# Patient Record
Sex: MALE | Race: WHITE | NOT HISPANIC OR LATINO | Employment: OTHER | URBAN - METROPOLITAN AREA
[De-identification: names, ages, dates, MRNs, and addresses within clinical notes are randomized per-mention and may not be internally consistent; named-entity substitution may affect disease eponyms.]

---

## 2019-09-25 ENCOUNTER — OFFICE VISIT (OUTPATIENT)
Dept: UROLOGY | Facility: CLINIC | Age: 34
End: 2019-09-25

## 2019-09-25 VITALS
DIASTOLIC BLOOD PRESSURE: 82 MMHG | BODY MASS INDEX: 23.49 KG/M2 | HEART RATE: 95 BPM | HEIGHT: 74 IN | WEIGHT: 183 LBS | SYSTOLIC BLOOD PRESSURE: 149 MMHG

## 2019-09-25 DIAGNOSIS — E29.1 TESTICULAR FAILURE: Primary | ICD-10-CM

## 2019-09-25 PROCEDURE — 99999 PR PBB SHADOW E&M-EST. PATIENT-LVL III: ICD-10-PCS | Mod: PBBFAC,,, | Performed by: UROLOGY

## 2019-09-25 PROCEDURE — 99999 PR PBB SHADOW E&M-EST. PATIENT-LVL III: CPT | Mod: PBBFAC,,, | Performed by: UROLOGY

## 2019-09-25 PROCEDURE — 99204 OFFICE O/P NEW MOD 45 MIN: CPT | Mod: S$GLB,,, | Performed by: UROLOGY

## 2019-09-25 PROCEDURE — 99204 PR OFFICE/OUTPT VISIT, NEW, LEVL IV, 45-59 MIN: ICD-10-PCS | Mod: S$GLB,,, | Performed by: UROLOGY

## 2019-09-25 RX ORDER — MULTIVITAMIN
1 TABLET ORAL
COMMUNITY

## 2019-09-25 RX ORDER — DEXTROAMPHETAMINE SACCHARATE, AMPHETAMINE ASPARTATE, DEXTROAMPHETAMINE SULFATE AND AMPHETAMINE SULFATE 5; 5; 5; 5 MG/1; MG/1; MG/1; MG/1
20 TABLET ORAL
COMMUNITY
Start: 2019-05-23

## 2019-09-25 NOTE — PROGRESS NOTES
"Chief Complaint:  Infertility    HPI:    Mr. Isidro is a 34 y.o.  male who has been  to his wife for the past 1 years. They have been trying to achieve a pregnancy for the past 1 years but without success. 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 He denies a history of erectile dysfunction and ejaculatory problems.    He is s/p R orchiectomy as a child for either UDT or torsion.    He has achieved 0 pregnancies in the past.    SA 19--4.5 cc/2.9 million per cc/32%/1.5%    HORMONE PANEL 19--  T--495  LH--4.9  FSH--7.5  Estradiol--14.8  Prolactin--9.2    Aracely Isidro is 35 years old. ( [birth year]) Her menses are regular. She has not undergone prior hysterosalpingogram. She has achieved 0 prior pregnancies.  She sees Dr. Montes.    The couple has not undergone prior intrauterine insemination procedures.    The couple has not undergone prior in-vitro fertilization procedures.    Jeovany Isidro denies a history of exposure to harmful chemicals, toxins, and radiation.    No history of recent fevers greater than 101.5 degrees Farenheit.    No history of recent exposure to "wet heat."    + history of urological trauma or testicular torsion.    No history of prostatitis, epididymitis, and orchitis.    No history of post-pubertal mumps.    There is no known family history of fertility problems.    REVIEW OF SYSTEMS:     He denies headache, blurred vision, fever, nausea, vomiting, chills, flank discomfort, abdominal pain, bleeding per rectum, cough, SOB, recent loss of consciousness, recent mental status changes, seizures, dizziness, or upper/lower extremity weakness.    PHYSICAL EXAM:     The patient generally appears in good health, is appropriately interactive, and is in no apparent distress.     Eyes: anicteric sclerae, moist conjunctivae; no lid-lag; PERRLA     HENT: Atraumatic; oropharynx clear with moist mucous membranes and no mucosal " "ulcerations;normal hard and soft palate.  No evidence of lymphadenopathy.    Neck: Trachea midline.  No thyromegaly.    Musculoskeletal: No abnormal gait.    Skin: No lesions.    Mental: Cooperative with normal affect.  Is oriented to time, place, and person.    Neuro: Grossly intact.    Chest: Normal inspiratory effort.   No accessory muscles.  No audible wheezes.  Respirations symmetric on inspiration and expiration.    Heart: Regular rhythm.      Abdomen:  Soft, non-tender. No masses or organomegaly. Bladder is not palpable. No evidence of flank discomfort. No evidence of inguinal hernia.    Genitourinary: Penis is normal with no evidence of plaques or induration. Urethral meatus is normal. Scrotum is normal. Testes are descended on the L with no evidence of abnormal masses or tenderness. Epididymis, vas deferens, and cord structures are normal bilaterally.  Testicular volume is approximately 22 cc on the L.    Extremities: No cyanosis, clubbing, or edema.    IMPRESSION & PLAN:    Mr. Isidro is a 34 y.o.  male who has been  to his wife for the past 1 years. They have been trying to achieve a pregnancy for the past 1 years but without success. 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 He denies a history of erectile dysfunction and ejaculatory problems.    He is s/p R orchiectomy as a child for either UDT or torsion.    He has achieved 0 pregnancies in the past.    SA 8/28/19--4.5 cc/2.9 million per cc/32%/1.5%    HORMONE PANEL 9/11/19--  T--495  LH--4.9  FSH--7.5  Estradiol--14.8  Prolactin--9.2    1.  Will draw karyotype and y chromosome microdeletion testing  2.  Semen analysis x 1 more.  3.  Return to the clinic in 3 weeks to discuss test results and treatment plan.  4.  Recommend avoiding "wet heat."  5.  Recommend taking a multivitamin and 500 mg of vitamin c daily in addition to the multivitamin.  6.  Please send a copy of the note to  " Simon.  Thank you for the consultation.      CC: Simon

## 2019-09-25 NOTE — LETTER
September 25, 2019        Aracely Montes MD  [address]  [facility] [address]             [facility] - Urology 4th Floor  [address]  [facility] [address]  Phone: [phone]   Patient: Jeovany Isidro   MR Number: [id number]   YOB: 1985   Date of Visit: 9/25/2019       Dear Dr. Montes:    Thank you for referring Jeovany Isidro to me for evaluation. Attached you will find relevant portions of my assessment and plan of care.    If you have questions, please do not hesitate to call me. I look forward to following Jeovany Isidro along with you.    Sincerely,      Hamzah Sarkar MD            CC  No Recipients    Enclosure

## 2019-10-16 ENCOUNTER — OFFICE VISIT (OUTPATIENT)
Dept: UROLOGY | Facility: CLINIC | Age: 34
End: 2019-10-16
Payer: COMMERCIAL

## 2019-10-16 VITALS
DIASTOLIC BLOOD PRESSURE: 78 MMHG | BODY MASS INDEX: 22.91 KG/M2 | WEIGHT: 178.56 LBS | HEART RATE: 59 BPM | SYSTOLIC BLOOD PRESSURE: 121 MMHG | HEIGHT: 74 IN

## 2019-10-16 DIAGNOSIS — E29.1 TESTICULAR FAILURE: Primary | ICD-10-CM

## 2019-10-16 PROCEDURE — 99999 PR PBB SHADOW E&M-EST. PATIENT-LVL III: ICD-10-PCS | Mod: PBBFAC,,, | Performed by: UROLOGY

## 2019-10-16 PROCEDURE — 99214 PR OFFICE/OUTPT VISIT, EST, LEVL IV, 30-39 MIN: ICD-10-PCS | Mod: S$GLB,,, | Performed by: UROLOGY

## 2019-10-16 PROCEDURE — 99214 OFFICE O/P EST MOD 30 MIN: CPT | Mod: S$GLB,,, | Performed by: UROLOGY

## 2019-10-16 PROCEDURE — 99999 PR PBB SHADOW E&M-EST. PATIENT-LVL III: CPT | Mod: PBBFAC,,, | Performed by: UROLOGY

## 2019-10-16 NOTE — PROGRESS NOTES
"Chief Complaint:  Infertility    HPI:    Mr. Isidro is a 34 y.o.  male who has been  to his wife for the past 1 years. They have been trying to achieve a pregnancy for the past 1 years but without success. Jeovany Isidro has  undergone a semen analysis x 2 showing severe oligoasthenoteratospermia on one and asthenoteratospermia on the other. He denies a history of erectile dysfunction and ejaculatory problems.    He is s/p R orchiectomy as a child for either UDT or torsion.    Karyotype and Y chromosome microdeletion testing are normal.    HORMONE PANEL 19--  T--495  LH--4.9  FSH--7.5  Estradiol--14.8  Prolactin--9.2    SA 19--4.5 cc/2.9 million per cc/32%/1.5%  SA 10/2/19--6.0 cc/23 million per cc/48%/2.5%    FOR REVIEW FROM PREVIOUS:    Mr. Isidro is a 34 y.o.  male who has been  to his wife for the past 1 years. They have been trying to achieve a pregnancy for the past 1 years but without success. 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 He denies a history of erectile dysfunction and ejaculatory problems.    He is s/p R orchiectomy as a child for either UDT or torsion.    He has achieved 0 pregnancies in the past.    SA 19--4.5 cc/2.9 million per cc/32%/1.5%    HORMONE PANEL 19--  T--495  LH--4.9  FSH--7.5  Estradiol--14.8  Prolactin--9.2    Aracely Isidro is 35 years old. ( [birth year]) Her menses are regular. She has not undergone prior hysterosalpingogram. She has achieved 0 prior pregnancies.  She sees Dr. Montes.    The couple has not undergone prior intrauterine insemination procedures.    The couple has not undergone prior in-vitro fertilization procedures.    Jeovany Isidro denies a history of exposure to harmful chemicals, toxins, and radiation.    No history of recent fevers greater than 101.5 degrees Farenheit.    No history of recent exposure to "wet heat."    + history of urological trauma or " testicular torsion.    No history of prostatitis, epididymitis, and orchitis.    No history of post-pubertal mumps.    There is no known family history of fertility problems.    REVIEW OF SYSTEMS:     He denies headache, blurred vision, fever, nausea, vomiting, chills, flank discomfort, abdominal pain, bleeding per rectum, cough, SOB, recent loss of consciousness, recent mental status changes, seizures, dizziness, or upper/lower extremity weakness.    PHYSICAL EXAM:     The patient generally appears in good health, is appropriately interactive, and is in no apparent distress.     Eyes: anicteric sclerae, moist conjunctivae; no lid-lag; PERRLA     HENT: Atraumatic; oropharynx clear with moist mucous membranes and no mucosal ulcerations;normal hard and soft palate.  No evidence of lymphadenopathy.    Neck: Trachea midline.  No thyromegaly.    Musculoskeletal: No abnormal gait.    Skin: No lesions.    Mental: Cooperative with normal affect.  Is oriented to time, place, and person.    Neuro: Grossly intact.    Chest: Normal inspiratory effort.   No accessory muscles.  No audible wheezes.  Respirations symmetric on inspiration and expiration.    Heart: Regular rhythm.      Abdomen:  Soft, non-tender. No masses or organomegaly. Bladder is not palpable. No evidence of flank discomfort. No evidence of inguinal hernia.    Genitourinary: Penis is normal with no evidence of plaques or induration. Urethral meatus is normal. Scrotum is normal. Testes are descended on the L with no evidence of abnormal masses or tenderness. Epididymis, vas deferens, and cord structures are normal bilaterally.  Testicular volume is approximately 22 cc on the L.    Extremities: No cyanosis, clubbing, or edema.    IMPRESSION & PLAN:    Mr. Isidro is a 34 y.o.  male who has been  to his wife for the past 1 years. They have been trying to achieve a pregnancy for the past 1 years but without success. Jeovany Isidro has  undergone a semen  "analysis x 2 showing severe oligoasthenoteratospermia on one and asthenoteratospermia on the other. He denies a history of erectile dysfunction and ejaculatory problems.    He is s/p R orchiectomy as a child for either UDT or torsion.    Karyotype and Y chromosome microdeletion testing are normal.    HORMONE PANEL 9/11/19--  T--495  LH--4.9  FSH--7.5  Estradiol--14.8  Prolactin--9.2    SA 8/28/19--4.5 cc/2.9 million per cc/32%/1.5%  SA 10/2/19--6.0 cc/23 million per cc/48%/2.5%    1.   Went over the results of his SA and hormonal panel today.    2.  Discussed that his two SA's are quite discordant.  Recommend a 3rd SA.  3.  Return to the clinic in 2 weeks to discuss test results and treatment plan.  4.  Recommend avoiding "wet heat."  5.  Recommend taking a multivitamin and 500 mg of vitamin c daily in addition to the multivitamin.  6.  Please send a copy of the note to Dr. Montes.    7.  I spent 25 minutes with the patient of which more than half was spent in direct consultation with the patient in regards to our treatment and plan.         CC: Simon    "

## 2019-10-20 ENCOUNTER — OFFICE VISIT (OUTPATIENT)
Dept: URGENT CARE | Facility: CLINIC | Age: 34
End: 2019-10-20

## 2019-10-20 VITALS
BODY MASS INDEX: 22.84 KG/M2 | HEIGHT: 74 IN | SYSTOLIC BLOOD PRESSURE: 124 MMHG | DIASTOLIC BLOOD PRESSURE: 75 MMHG | WEIGHT: 178 LBS | RESPIRATION RATE: 16 BRPM | TEMPERATURE: 98 F | HEART RATE: 75 BPM | OXYGEN SATURATION: 98 %

## 2019-10-20 DIAGNOSIS — S61.213A LACERATION OF LEFT MIDDLE FINGER, FOREIGN BODY PRESENCE UNSPECIFIED, NAIL DAMAGE STATUS UNSPECIFIED, INITIAL ENCOUNTER: Primary | ICD-10-CM

## 2019-10-20 PROCEDURE — 99214 PR OFFICE/OUTPT VISIT, EST, LEVL IV, 30-39 MIN: ICD-10-PCS | Mod: S$GLB,,, | Performed by: FAMILY MEDICINE

## 2019-10-20 PROCEDURE — 99214 OFFICE O/P EST MOD 30 MIN: CPT | Mod: S$GLB,,, | Performed by: FAMILY MEDICINE

## 2019-10-20 RX ORDER — SULFAMETHOXAZOLE AND TRIMETHOPRIM 800; 160 MG/1; MG/1
1 TABLET ORAL 2 TIMES DAILY
Qty: 20 TABLET | Refills: 0 | Status: SHIPPED | OUTPATIENT
Start: 2019-10-20 | End: 2019-10-30

## 2019-10-20 NOTE — PROGRESS NOTES
"Subjective:       Patient ID: Jeovany Isidro is a 34 y.o. male.    Vitals:  height is 6' 2" (1.88 m) and weight is 80.7 kg (178 lb). His temperature is 98.2 °F (36.8 °C). His blood pressure is 124/75 and his pulse is 75. His respiration is 16 and oxygen saturation is 98%.     Chief Complaint: Laceration (Left)    Pt states he cut his hand on a wine glass. He was washing the glass then it broke when he was druing it. Bleeding+ tetanus vaccine UTD.    Laceration    The incident occurred less than 1 hour ago. The laceration is located on the left hand. The laceration mechanism was a broken glass. The patient is experiencing no pain. He reports no foreign bodies present. His tetanus status is UTD.       Constitution: Negative for chills, fatigue and fever.   HENT: Negative for congestion and sore throat.    Neck: Negative for painful lymph nodes.   Cardiovascular: Negative for chest pain and leg swelling.   Eyes: Negative for double vision and blurred vision.   Respiratory: Negative for cough and shortness of breath.    Gastrointestinal: Negative for nausea, vomiting and diarrhea.   Genitourinary: Negative for dysuria, frequency and urgency.   Musculoskeletal: Negative for joint pain, joint swelling, muscle cramps and muscle ache.   Skin: Positive for laceration. Negative for color change, pale and rash.   Allergic/Immunologic: Negative for seasonal allergies.   Neurological: Negative for dizziness, history of vertigo, light-headedness, passing out and headaches.   Hematologic/Lymphatic: Negative for swollen lymph nodes, easy bruising/bleeding and history of blood clots. Does not bruise/bleed easily.   Psychiatric/Behavioral: Negative for nervous/anxious, sleep disturbance and depression. The patient is not nervous/anxious.        PMH/PSH/FH/SH/MED/ALLERGY reviewed    Objective:       Vitals:    10/20/19 1747   BP: 124/75   Pulse: 75   Resp: 16   Temp: 98.2 °F (36.8 °C)       Physical Exam   Constitutional: He is " oriented to person, place, and time. He appears well-developed and well-nourished. He is cooperative.  Non-toxic appearance. He does not appear ill. No distress.   HENT:   Head: Normocephalic and atraumatic.   Right Ear: Hearing, tympanic membrane, external ear and ear canal normal.   Left Ear: Hearing, tympanic membrane, external ear and ear canal normal.   Nose: Nose normal. No mucosal edema, rhinorrhea or nasal deformity. No epistaxis. Right sinus exhibits no maxillary sinus tenderness and no frontal sinus tenderness. Left sinus exhibits no maxillary sinus tenderness and no frontal sinus tenderness.   Mouth/Throat: Uvula is midline, oropharynx is clear and moist and mucous membranes are normal. No trismus in the jaw. Normal dentition. No uvula swelling. No posterior oropharyngeal erythema.   Eyes: Conjunctivae and lids are normal. Right eye exhibits no discharge. Left eye exhibits no discharge. No scleral icterus.   Neck: Trachea normal, normal range of motion, full passive range of motion without pain and phonation normal. Neck supple.   Cardiovascular: Normal rate, regular rhythm, normal heart sounds, intact distal pulses and normal pulses.   Pulmonary/Chest: Effort normal and breath sounds normal. No respiratory distress.   Abdominal: Soft. Normal appearance and bowel sounds are normal. He exhibits no distension, no pulsatile midline mass and no mass. There is no tenderness.   Musculoskeletal: Normal range of motion. He exhibits no edema or deformity.   Neurological: He is alert and oriented to person, place, and time. He exhibits normal muscle tone. Coordination normal.   Skin: Skin is warm, dry, intact, not diaphoretic and not pale.   Laceration 2 cm left middle finger. Bleeding+   Psychiatric: He has a normal mood and affect. His speech is normal and behavior is normal. Judgment and thought content normal. Cognition and memory are normal.   Nursing note and vitals reviewed.        Assessment:       1.  Laceration of left middle finger, foreign body presence unspecified, nail damage status unspecified, initial encounter        Plan:         Laceration of left middle finger, foreign body presence unspecified, nail damage status unspecified, initial encounter  -     sulfamethoxazole-trimethoprim 800-160mg (BACTRIM DS) 800-160 mg Tab; Take 1 tablet by mouth 2 (two) times daily. for 10 days  Dispense: 20 tablet; Refill: 0         Laceration left middle finger  -laceration re;pair done    SUBJECTIVE:   34 y.o. male sustained laceration of finger 1 hours ago. Nature of injury: cut with wine glass. Tetanus vaccination status reviewed: tetanus re-vaccination not indicated.     OBJECTIVE:   Patient appears well, vitals are normal. Laceration 2 cm noted.  Description: clean wound edges, no foreign bodies. Neurovascular and tendon structures are intact.    ASSESSMENT:   Laceration as described.    PLAN:   Anesthesia with 1% Lidocaine with Epinephrine. Wound cleansed, debrided of visible foreign material and necrotic tissue, and sutured. Antibiotic ointment and dressing applied.  Wound care instructions provided.  Observe for any signs of infection or other problems.  Return for suture removal in 7 days.

## 2019-10-20 NOTE — PATIENT INSTRUCTIONS
Extremity Laceration: Sutures, Staples, or Tape  A laceration is a cut through the skin. If it is deep, it may require stitches (sutures) or staples to close so it can heal. Minor cuts may be treated with surgical tape closures.   X-rays may be done if something may have entered the skin through the cut. You may also need a tetanus shot if you are not up to date on this vaccination.  Home care  · Follow the health care providers instructions on how to care for the cut.  · Wash your hands with soap and warm water before and after caring for your wound. This is to help prevent infection.  · Keep the wound clean and dry. If a bandage was applied and it becomes wet or dirty, replace it. Otherwise, leave it in place for the first 24 hours, then change it once a day or as directed.  · If sutures or staples were used, clean the wound daily:  · After removing the bandage, wash the area with soap and water. Use a wet cotton swab to loosen and remove any blood or crust that forms.  · After cleaning, keep the wound clean and dry. Talk with your doctor before applying any antibiotic ointment to the wound. Reapply the bandage.  · You may remove the bandage to shower as usual after the first 24 hours, but do not soak the area in water (no swimming) until the stitches or staples are removed.  · If surgical tape closures were used, keep the area clean and dry. If it becomes wet, blot it dry with a towel.  · The doctor may prescribe an antibiotic cream or ointment to prevent infection. Do not stop taking this medication until you have finished the prescribed course or the doctor tells you to stop. The doctor may also prescribe medications for pain. Follow the doctors instructions for taking these medications.  · Avoid activities that may reopen your wound.  Follow-up care  Follow up with your health care provider. Most skin wounds heal within ten days. However, an infection may sometimes occur despite proper treatment.  Therefore, check the wound daily for the signs of infection listed below. Stitches and staples should be removed within 7-14 days. If surgical tape closures were used, you may remove them after 10 days if they have not fallen off by then.   When to seek medical advice  Call your health care provider right away if any of these occur:  · Wound bleeding not controlled by direct pressure  · Signs of infection, including increasing pain in the wound, increasing wound redness or swelling, or pus or bad odor coming from the wound  · Fever of 100.4°F (38ºC) or higher or as directed by your healthcare provider  · Stitches or staples come apart or fall out or surgical tape falls off before 7 days  · Wound edges re-open  · Wound changes colors  · Numbness around the wound   · Decreased movement around the injured area  Date Last Reviewed: 6/14/2015  © 9992-4679 [employer]. [address] [place] [address], [place] [address]. All rights reserved. This information is not intended as a substitute for professional medical care. Always follow your healthcare professional's instructions.

## 2019-10-31 ENCOUNTER — OFFICE VISIT (OUTPATIENT)
Dept: UROLOGY | Facility: CLINIC | Age: 34
End: 2019-10-31

## 2019-10-31 VITALS
HEART RATE: 62 BPM | SYSTOLIC BLOOD PRESSURE: 137 MMHG | DIASTOLIC BLOOD PRESSURE: 76 MMHG | HEIGHT: 74 IN | BODY MASS INDEX: 23.29 KG/M2 | WEIGHT: 181.44 LBS

## 2019-10-31 DIAGNOSIS — E29.1 TESTICULAR FAILURE: Primary | ICD-10-CM

## 2019-10-31 PROCEDURE — 99213 OFFICE O/P EST LOW 20 MIN: CPT | Mod: PBBFAC | Performed by: UROLOGY

## 2019-10-31 PROCEDURE — 99999 PR PBB SHADOW E&M-EST. PATIENT-LVL III: ICD-10-PCS | Mod: PBBFAC,,, | Performed by: UROLOGY

## 2019-10-31 PROCEDURE — 99999 PR PBB SHADOW E&M-EST. PATIENT-LVL III: CPT | Mod: PBBFAC,,, | Performed by: UROLOGY

## 2019-10-31 PROCEDURE — 99214 PR OFFICE/OUTPT VISIT, EST, LEVL IV, 30-39 MIN: ICD-10-PCS | Mod: S$PBB,,, | Performed by: UROLOGY

## 2019-10-31 PROCEDURE — 99214 OFFICE O/P EST MOD 30 MIN: CPT | Mod: S$PBB,,, | Performed by: UROLOGY

## 2019-10-31 NOTE — LETTER
October 31, 2019        Aracely Montes MD  [address]  [facility] [address]             [facility] - Urology 4th Floor  [address]  [facility] [address]  Phone: [phone]   Patient: Jeovany Isidro   MR Number: [id number]   YOB: 1985   Date of Visit: 10/31/2019       Dear Dr. Montes:    Thank you for referring Jeovany Isidro to me for evaluation. Attached you will find relevant portions of my assessment and plan of care.    If you have questions, please do not hesitate to call me. I look forward to following Jeovany Isidro along with you.    Sincerely,      Hamzah Sarkar MD            CC  No Recipients    Enclosure

## 2019-10-31 NOTE — PROGRESS NOTES
Chief Complaint:  Infertility    HPI:    Mr. Isidro is a 34 y.o.  male who has been  to his wife for the past 1 years. They have been trying to achieve a pregnancy for the past 1 years but without success. 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 He denies a history of erectile dysfunction and ejaculatory problems.    He is s/p R orchiectomy as a child for either UDT or torsion.    Karyotype and Y chromosome microdeletion testing are normal.    HORMONE PANEL 19--  T--495  LH--4.9  FSH--7.5  Estradiol--14.8  Prolactin--9.2    SA 19--4.5 cc/2.9 million per cc/32%/1.5%  SA 10/2/19--6.0 cc/23 million per cc/48%/2.5%  SA 10/24/19--6.0 cc/16.5 million per cc/58%/2.5%    FOR REVIEW FROM PREVIOUS:    Mr. Isidro is a 34 y.o.  male who has been  to his wife for the past 1 years. They have been trying to achieve a pregnancy for the past 1 years but without success. Jeovany Isidro has  undergone a semen analysis x 2 showing severe oligoasthenoteratospermia on one and asthenoteratospermia on the other. He denies a history of erectile dysfunction and ejaculatory problems.    He is s/p R orchiectomy as a child for either UDT or torsion.    Karyotype and Y chromosome microdeletion testing are normal.    HORMONE PANEL 19--  T--495  LH--4.9  FSH--7.5  Estradiol--14.8  Prolactin--9.2    SA 19--4.5 cc/2.9 million per cc/32%/1.5%  SA 10/2/19--6.0 cc/23 million per cc/48%/2.5%    Aracely Isidro is 35 years old. ( [birth year]) Her menses are regular. She has not undergone prior hysterosalpingogram. She has achieved 0 prior pregnancies.  She sees Dr. Montes.    The couple has not undergone prior intrauterine insemination procedures.    The couple has not undergone prior in-vitro fertilization procedures.    Jeovany Isidro denies a history of exposure to harmful  "chemicals, toxins, and radiation.    No history of recent fevers greater than 101.5 degrees Farenheit.    No history of recent exposure to "wet heat."    + history of urological trauma or testicular torsion.    No history of prostatitis, epididymitis, and orchitis.    No history of post-pubertal mumps.    There is no known family history of fertility problems.    REVIEW OF SYSTEMS:     He denies headache, blurred vision, fever, nausea, vomiting, chills, flank discomfort, abdominal pain, bleeding per rectum, cough, SOB, recent loss of consciousness, recent mental status changes, seizures, dizziness, or upper/lower extremity weakness.    PHYSICAL EXAM:     The patient generally appears in good health, is appropriately interactive, and is in no apparent distress.     Eyes: anicteric sclerae, moist conjunctivae; no lid-lag; PERRLA     HENT: Atraumatic; oropharynx clear with moist mucous membranes and no mucosal ulcerations;normal hard and soft palate.  No evidence of lymphadenopathy.    Neck: Trachea midline.  No thyromegaly.    Musculoskeletal: No abnormal gait.    Skin: No lesions.    Mental: Cooperative with normal affect.  Is oriented to time, place, and person.    Neuro: Grossly intact.    Chest: Normal inspiratory effort.   No accessory muscles.  No audible wheezes.  Respirations symmetric on inspiration and expiration.    Heart: Regular rhythm.      Abdomen:  Soft, non-tender. No masses or organomegaly. Bladder is not palpable. No evidence of flank discomfort. No evidence of inguinal hernia.    Genitourinary: Penis is normal with no evidence of plaques or induration. Urethral meatus is normal. Scrotum is normal. Testes are descended on the L with no evidence of abnormal masses or tenderness. Epididymis, vas deferens, and cord structures are normal bilaterally.  Testicular volume is approximately 22 cc on the L.    Extremities: No cyanosis, clubbing, or edema.    IMPRESSION & PLAN:    Mr. Isidro is a 34 y.o.  " "male who has been  to his wife for the past 1 years. They have been trying to achieve a pregnancy for the past 1 years but without success. 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 He denies a history of erectile dysfunction and ejaculatory problems.    He is s/p R orchiectomy as a child for either UDT or torsion.    Karyotype and Y chromosome microdeletion testing are normal.    HORMONE PANEL 9/11/19--  T--495  LH--4.9  FSH--7.5  Estradiol--14.8  Prolactin--9.2    SA 8/28/19--4.5 cc/2.9 million per cc/32%/1.5%  SA 10/2/19--6.0 cc/23 million per cc/48%/2.5%  SA 10/24/19--6.0 cc/16.5 million per cc/58%/2.5%    1.   Went over the results of his SA and hormonal panel today.    2.   From a male factor perspective efforts with natural means, IUI, and IVF are all appropriate.  3.   I spent 25 minutes with the patient of which more than half was spent in direct consultation with the patient in regards to our treatment and plan.     4.  Recommend avoiding "wet heat."  5.  Recommend taking a multivitamin and 500 mg of vitamin c daily in addition to the multivitamin.  6.  Please send a copy of the note to Dr. Montes.    7.  RTC 6 months if not pregnant by then.      CC: Simon    "

## 2022-12-09 ENCOUNTER — PATIENT MESSAGE (OUTPATIENT)
Dept: UROLOGY | Facility: CLINIC | Age: 37
End: 2022-12-09

## 2022-12-14 ENCOUNTER — OFFICE VISIT (OUTPATIENT)
Dept: UROLOGY | Facility: CLINIC | Age: 37
End: 2022-12-14

## 2022-12-14 ENCOUNTER — LAB VISIT (OUTPATIENT)
Dept: LAB | Facility: HOSPITAL | Age: 37
End: 2022-12-14
Attending: UROLOGY

## 2022-12-14 VITALS
SYSTOLIC BLOOD PRESSURE: 130 MMHG | BODY MASS INDEX: 21.79 KG/M2 | HEIGHT: 74 IN | WEIGHT: 169.75 LBS | HEART RATE: 62 BPM | DIASTOLIC BLOOD PRESSURE: 72 MMHG

## 2022-12-14 DIAGNOSIS — I86.1 VARICOCELE: ICD-10-CM

## 2022-12-14 DIAGNOSIS — E29.1 TESTICULAR FAILURE: ICD-10-CM

## 2022-12-14 DIAGNOSIS — E29.1 TESTICULAR FAILURE: Primary | ICD-10-CM

## 2022-12-14 LAB
ESTRADIOL SERPL-MCNC: 16 PG/ML (ref 11–44)
FSH SERPL-ACNC: 7.71 MIU/ML (ref 0.95–11.95)
LH SERPL-ACNC: 3.2 MIU/ML (ref 0.6–12.1)
TESTOST SERPL-MCNC: 496 NG/DL (ref 304–1227)

## 2022-12-14 PROCEDURE — 99213 OFFICE O/P EST LOW 20 MIN: CPT | Mod: PBBFAC | Performed by: UROLOGY

## 2022-12-14 PROCEDURE — 36415 COLL VENOUS BLD VENIPUNCTURE: CPT | Performed by: UROLOGY

## 2022-12-14 PROCEDURE — 83001 ASSAY OF GONADOTROPIN (FSH): CPT | Performed by: UROLOGY

## 2022-12-14 PROCEDURE — 83002 ASSAY OF GONADOTROPIN (LH): CPT | Performed by: UROLOGY

## 2022-12-14 PROCEDURE — 99999 PR PBB SHADOW E&M-EST. PATIENT-LVL III: ICD-10-PCS | Mod: PBBFAC,,, | Performed by: UROLOGY

## 2022-12-14 PROCEDURE — 84403 ASSAY OF TOTAL TESTOSTERONE: CPT | Performed by: UROLOGY

## 2022-12-14 PROCEDURE — 99203 OFFICE O/P NEW LOW 30 MIN: CPT | Mod: S$PBB,,, | Performed by: UROLOGY

## 2022-12-14 PROCEDURE — 99999 PR PBB SHADOW E&M-EST. PATIENT-LVL III: CPT | Mod: PBBFAC,,, | Performed by: UROLOGY

## 2022-12-14 PROCEDURE — 82670 ASSAY OF TOTAL ESTRADIOL: CPT | Performed by: UROLOGY

## 2022-12-14 PROCEDURE — 99203 PR OFFICE/OUTPT VISIT, NEW, LEVL III, 30-44 MIN: ICD-10-PCS | Mod: S$PBB,,, | Performed by: UROLOGY

## 2022-12-14 NOTE — LETTER
December 14, 2022        Niko Lara III, MD  [address] [facility], [place]  [address]  [facility] [address]             [facility] 4th Fl  [address]  [facility] [address]  Phone: [phone]   Patient: Jeovany Isidro   MR Number: [id number]   YOB: 1985   Date of Visit: 12/14/2022       Dear Dr. Lara:    Thank you for referring Jeovany Isidro to me for evaluation. Attached you will find relevant portions of my assessment and plan of care.    If you have questions, please do not hesitate to call me. I look forward to following Jeovany Isidro along with you.    Sincerely,      Hamzah Sarkar MD            CC  No Recipients    Enclosure

## 2022-12-14 NOTE — PROGRESS NOTES
Chief Complaint:  Infertility    HPI:    Mr. Isidro is a 37 y.o.  male who has been  to his wife for the past 5 years. They have been trying to achieve a pregnancy for the past 6 months but without success. 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     He was last seen in 2019, and he has been lost to follow up.  They did conceive in in 2020 via IVF.    They transferred an embryo from the 1st IVF cycle in 2/22, but she miscarried.  They have not been able to retrieve eggs for IVF since then.  The wife reports that her ovarian reserve is poor.  Are currently seeing Dr. Lara.    He is s/p R orchiectomy as a child for either UDT or torsion.    Karyotype and Y chromosome microdeletion testing are normal.    HORMONE PANEL 9/11/19--  T--495  LH--4.9  FSH--7.5  Estradiol--14.8  Prolactin--9.2    SA 8/28/19--4.5 cc/2.9 million per cc/32%/1.5%  SA 10/2/19--6.0 cc/23 million per cc/48%/2.5%  SA 10/24/19--6.0 cc/16.5 million per cc/58%/2.5%  SA 6/20/22--3.5 cc/12 million per cc/58%/1.5%    FOR REVIEW FROM PREVIOUS:    Mr. Isidro is a 34 y.o.  male who has been  to his wife for the past 1 years. They have been trying to achieve a pregnancy for the past 1 years but without success. 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 He denies a history of erectile dysfunction and ejaculatory problems.    He is s/p R orchiectomy as a child for either UDT or torsion.    Karyotype and Y chromosome microdeletion testing are normal.    HORMONE PANEL 9/11/19--  T--495  LH--4.9  FSH--7.5  Estradiol--14.8  Prolactin--9.2    SA 8/28/19--4.5 cc/2.9 million per cc/32%/1.5%  SA 10/2/19--6.0 cc/23 million per cc/48%/2.5%   10/24/19--6.0 cc/16.5 million per  "cc/58%/2.5%    Aracely Isidro is 35 years old. ( [birth year]) Her menses are regular. She has not undergone prior hysterosalpingogram. She has achieved 0 prior pregnancies.  She sees Dr. Montes.    The couple has not undergone prior intrauterine insemination procedures.    The couple has not undergone prior in-vitro fertilization procedures.    Ceasarophnadya Isidro denies a history of exposure to harmful chemicals, toxins, and radiation.    No history of recent fevers greater than 101.5 degrees Farenheit.    No history of recent exposure to "wet heat."    + history of urological trauma or testicular torsion.    No history of prostatitis, epididymitis, and orchitis.    No history of post-pubertal mumps.    There is no known family history of fertility problems.    REVIEW OF SYSTEMS:     He denies headache, blurred vision, fever, nausea, vomiting, chills, flank discomfort, abdominal pain, bleeding per rectum, cough, SOB, recent loss of consciousness, recent mental status changes, seizures, dizziness, or upper/lower extremity weakness.    PHYSICAL EXAM:     The patient generally appears in good health, is appropriately interactive, and is in no apparent distress.     Eyes: anicteric sclerae, moist conjunctivae; no lid-lag; PERRLA     HENT: Atraumatic; oropharynx clear with moist mucous membranes and no mucosal ulcerations;normal hard and soft palate.  No evidence of lymphadenopathy.    Neck: Trachea midline.  No thyromegaly.    Musculoskeletal: No abnormal gait.    Skin: No lesions.    Mental: Cooperative with normal affect.  Is oriented to time, place, and person.    Neuro: Grossly intact.    Chest: Normal inspiratory effort.   No accessory muscles.  No audible wheezes.  Respirations symmetric on inspiration and expiration.    Heart: Regular rhythm.      Abdomen:  Soft, non-tender. No masses or organomegaly. Bladder is not palpable. No evidence of flank discomfort. No evidence of inguinal " hernia.    Genitourinary: Penis is normal with no evidence of plaques or induration. Urethral meatus is normal. Scrotum is normal. Testes are descended on the L with no evidence of abnormal masses or tenderness. Epididymis, vas deferens, and cord structures are normal bilaterally.  Testicular volume is approximately 22 cc on the L.  He has a L grade II varicocele.    Extremities: No cyanosis, clubbing, or edema.    IMPRESSION & PLAN:    Mr. Isidro is a 37 y.o.  male who has been  to his wife for the past 5 years. They have been trying to achieve a pregnancy for the past 6 months but without success. 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     He was last seen in 2019, and he has been lost to follow up.  They did conceive in in 2020 via IVF.    They transferred an embryo from the 1st IVF cycle in 2/22, but she miscarried.  They have not been able to retrieve eggs for IVF since then.  Are currently seeing Dr. Lara.    He is s/p R orchiectomy as a child for either UDT or torsion.    Karyotype and Y chromosome microdeletion testing are normal.    HORMONE PANEL 9/11/19--  T--495  LH--4.9  FSH--7.5  Estradiol--14.8  Prolactin--9.2    SA 8/28/19--4.5 cc/2.9 million per cc/32%/1.5%  SA 10/2/19--6.0 cc/23 million per cc/48%/2.5%  SA 10/24/19--6.0 cc/16.5 million per cc/58%/2.5%  SA 6/20/22--3.5 cc/12 million per cc/58%/1.5%    1.   Will draw T, LH, FSH, and estradiol today.  2.  Discussed varicocele's potential impact on fertility.  Discussed the literature suggesting that IVF rates are more successful if a varicocele is corrected.   3.  We discussed the risks and benefits of varicocele repair.  We specifically addressed the chance of failure to improve semen parameters, bleeding, infection, pain, loss of testicle, damage to the vas.  We discussed this amongst other risks and  "discussed alternatives.  He was given the chance to ask questions.  Will schedule for elective varicocele repair.    4.  Recommend avoiding "wet heat."  5.  Recommend taking a multivitamin and 500 mg of vitamin c daily in addition to the multivitamin.  6.  Please send a copy of the note to Dr. Lara.    7.  Independently interpreted his labs and outside SA's today.         CC: Marco      "